# Patient Record
Sex: MALE | Race: WHITE | NOT HISPANIC OR LATINO | Employment: FULL TIME | URBAN - NONMETROPOLITAN AREA
[De-identification: names, ages, dates, MRNs, and addresses within clinical notes are randomized per-mention and may not be internally consistent; named-entity substitution may affect disease eponyms.]

---

## 2019-02-28 ENCOUNTER — OFFICE VISIT (OUTPATIENT)
Dept: CARDIOLOGY | Facility: CLINIC | Age: 24
End: 2019-02-28

## 2019-02-28 VITALS
OXYGEN SATURATION: 96 % | SYSTOLIC BLOOD PRESSURE: 125 MMHG | DIASTOLIC BLOOD PRESSURE: 80 MMHG | BODY MASS INDEX: 37.19 KG/M2 | WEIGHT: 315 LBS | HEART RATE: 110 BPM | HEIGHT: 77 IN

## 2019-02-28 DIAGNOSIS — R06.02 SOB (SHORTNESS OF BREATH): ICD-10-CM

## 2019-02-28 DIAGNOSIS — Z86.711 HISTORY OF PULMONARY EMBOLUS (PE): ICD-10-CM

## 2019-02-28 DIAGNOSIS — R00.2 PALPITATIONS: ICD-10-CM

## 2019-02-28 DIAGNOSIS — R01.1 MURMUR: ICD-10-CM

## 2019-02-28 DIAGNOSIS — R07.2 PRECORDIAL PAIN: Primary | ICD-10-CM

## 2019-02-28 PROCEDURE — 99204 OFFICE O/P NEW MOD 45 MIN: CPT | Performed by: PHYSICIAN ASSISTANT

## 2019-02-28 RX ORDER — LEVOTHYROXINE SODIUM 0.05 MG/1
50 TABLET ORAL DAILY
COMMUNITY

## 2019-02-28 RX ORDER — LISINOPRIL 5 MG/1
5 TABLET ORAL DAILY
COMMUNITY

## 2019-02-28 RX ORDER — KETOROLAC TROMETHAMINE 10 MG/1
10 TABLET, FILM COATED ORAL EVERY 6 HOURS PRN
COMMUNITY

## 2019-02-28 NOTE — PATIENT INSTRUCTIONS
What You Need to Know About Smokeless Tobacco Use  Tobacco use is one of the leading causes of cancer and other chronic health problems. Smokeless tobacco is tobacco that is put directly into the mouth instead of being smoked. It may also be called chewing tobacco or snuff. Smokeless tobacco is made from the leaves of tobacco plants and it comes in several forms:  · Loose, dry leaves, plugs, or twists.  · Moist pouches.  · Dissolving lozenges or strips.    Chewing, sucking, or holding the tobacco in your mouth causes your mouth to make more saliva. The saliva mixes with the tobacco to make “tobacco juice” that is swallowed or spit out.  How can smokeless tobacco affect me?  Using smokeless tobacco:  · Increases your risk of developing cancer. Smokeless tobacco contains at least 28 different types of cancer-causing chemicals (carcinogens).  · Increases your chances of developing other long-term health problems, including high blood pressure, heart disease, stroke, and dental problems.  · Can make you become addicted. Nicotine is one of the chemicals in tobacco. When you chew tobacco, you absorb nicotine from the tobacco juice. This can make you feel more alert than usual.  · Can cause problems with pregnancy. Pregnant women who use smokeless tobacco are more likely to miscarry or deliver a baby too early (premature delivery).  · Can affect the appearance and health of your mouth. Using smokeless tobacco may cause bad breath, yellow-brown teeth, mouth sores, cracking and bleeding lips, gum recession, and lesions on the soft tissues of your mouth (leukoplakia).    What are the benefits of not using smokeless tobacco?  The benefits of not using smokeless tobacco include:  · A healthy mind because:  ? You avoid addiction.  · A healthy body because:  ? You avoid dental problems.  ? You promote healthy pregnancy.  ? You avoid long-term health problems.  · A healthy wallet because:  ? You avoid costs of buying  tobacco.  ? You avoid health care costs in the future.  · A healthy family because:  ? You avoid accidental poisoning of children in your household.    What can happen if I continue to use smokeless tobacco?  If you continue to use smokeless tobacco, you will increase your risk for developing certain cancers. These include:  · Tongue.  · Lips, mouth, and gums.  · Throat (esophagus) and voice box (larynx).  · Stomach.  · Pancreas.  · Bladder.  · Colon.    Long-term use of smokeless tobacco can also lead to:  · High blood pressure, heart disease, and stroke.  · Gum disease, gum recession, and bone loss around the teeth.  · Tooth decay.    How do I quit using smokeless tobacco?  Quitting the use of smokeless tobacco can be hard, but it can be done. Follow these steps:  · Pick a date to quit. Set a date within the next two weeks. This gives you time to prepare.  · Write down the reasons why you are quitting. Keep this list in places where you will see it often, such as on your bathroom mirror or in your car or wallet.  · Identify the people, places, things, and activities that make you want to use tobacco (triggers) and avoid them.  · Get rid of any tobacco you have and remove any tobacco smells. To do this:  ? Throw away all containers of tobacco at home, at work, and in your car.  ? Throw away any other items that you use regularly when you chew tobacco.  ? Clean your car and make sure to remove all tobacco-related items.  ? Clean your home, including curtains and carpets.  · Tell your family, friends, and coworkers that you are quitting. This can make quitting easier.  · Ask your health care provider for help quitting smokeless tobacco. This may involve treatment. Find out what treatment options are covered by your health insurance.  · Keep track of how many days have passed since you quit. Remembering how long and hard you have worked to quit can help you avoid using tobacco again.    Where can I get support?  Ask  your health care provider if there is a local support group for quitting smokeless tobacco.  Where can I get more information?  You can learn more about the risks of using smokeless tobacco and the benefits of quitting from these sources:  · National Cancer [place]: www.cancer.gov  · American Cancer Society: www.cancer.org    When should I seek medical care?  Seek medical care if you have:  · White or other discolored patches in your mouth.  · Difficulty swallowing.  · A change in your voice.  · Unexplained weight loss.  · Stomach pain, nausea, or vomiting.    Summary  · Smokeless tobacco contains at least 28 different chemicals that are known to cause cancer (carcinogen).  · Nicotine is an addictive chemical in smokeless tobacco.  · When you quit using smokeless tobacco, you lower your risk of developing cancer.  This information is not intended to replace advice given to you by your health care provider. Make sure you discuss any questions you have with your health care provider.  Document Released: 05/21/2012 Document Revised: 08/12/2017 Document Reviewed: 07/29/2016  [employer] Interactive Patient Education © 2018 [employer]  Steps to Quit Smoking  Smoking tobacco can be bad for your health. It can also affect almost every organ in your body. Smoking puts you and people around you at risk for many serious long-lasting (chronic) diseases. Quitting smoking is hard, but it is one of the best things that you can do for your health. It is never too late to quit.  What are the benefits of quitting smoking?  When you quit smoking, you lower your risk for getting serious diseases and conditions. They can include:  · Lung cancer or lung disease.  · Heart disease.  · Stroke.  · Heart attack.  · Not being able to have children (infertility).  · Weak bones (osteoporosis) and broken bones (fractures).    If you have coughing, wheezing, and shortness of breath, those symptoms may get better when you quit. You may also get  sick less often. If you are pregnant, quitting smoking can help to lower your chances of having a baby of low birth weight.  What can I do to help me quit smoking?  Talk with your doctor about what can help you quit smoking. Some things you can do (strategies) include:  · Quitting smoking totally, instead of slowly cutting back how much you smoke over a period of time.  · Going to in-person counseling. You are more likely to quit if you go to many counseling sessions.  · Using resources and support systems, such as:  ? Online chats with a counselor.  ? Phone quitlines.  ? Printed self-help materials.  ? Support groups or group counseling.  ? Text messaging programs.  ? Mobile phone apps or applications.  · Taking medicines. Some of these medicines may have nicotine in them. If you are pregnant or breastfeeding, do not take any medicines to quit smoking unless your doctor says it is okay. Talk with your doctor about counseling or other things that can help you.    Talk with your doctor about using more than one strategy at the same time, such as taking medicines while you are also going to in-person counseling. This can help make quitting easier.  What things can I do to make it easier to quit?  Quitting smoking might feel very hard at first, but there is a lot that you can do to make it easier. Take these steps:  · Talk to your family and friends. Ask them to support and encourage you.  · Call phone quitlines, reach out to support groups, or work with a counselor.  · Ask people who smoke to not smoke around you.  · Avoid places that make you want (trigger) to smoke, such as:  ? Bars.  ? Parties.  ? Smoke-break areas at work.  · Spend time with people who do not smoke.  · Lower the stress in your life. Stress can make you want to smoke. Try these things to help your stress:  ? Getting regular exercise.  ? Deep-breathing exercises.  ? Yoga.  ? Meditating.  ? Doing a body scan. To do this, close your eyes, focus on one  "area of your body at a time from head to toe, and notice which parts of your body are tense. Try to relax the muscles in those areas.  · Download or buy apps on your mobile phone or tablet that can help you stick to your quit plan. There are many free apps, such as QuitGuide from the CDC (Centers for Disease Control and Prevention). You can find more support from smokefree.gov and other websites.    This information is not intended to replace advice given to you by your health care provider. Make sure you discuss any questions you have with your health care provider.  Document Released: 10/14/2010 Document Revised: 08/15/2017 Document Reviewed: 05/03/2016  [employer] Interactive Patient Education © 2018 [employer]  Fat and Cholesterol Restricted Diet  Getting too much fat and cholesterol in your diet may cause health problems. Following this diet helps keep your fat and cholesterol at normal levels. This can keep you from getting sick.  What types of fat should I choose?  · Choose monosaturated and polyunsaturated fats. These are found in foods such as olive oil, canola oil, flaxseeds, walnuts, almonds, and seeds.  · Eat more omega-3 fats. Good choices include salmon, mackerel, sardines, tuna, flaxseed oil, and ground flaxseeds.  · Limit saturated fats. These are in animal products such as meats, butter, and cream. They can also be in plant products such as palm oil, palm kernel oil, and coconut oil.  · Avoid foods with partially hydrogenated oils in them. These contain trans fats. Examples of foods that have trans fats are stick margarine, some tub margarines, cookies, crackers, and other baked goods.  What general guidelines do I need to follow?  · Check food labels. Look for the words \"trans fat\" and \"saturated fat.\"  · When preparing a meal:  ? Fill half of your plate with vegetables and green salads.  ? Fill one fourth of your plate with whole grains. Look for the word \"whole\" as the first word in the " ingredient list.  ? Fill one fourth of your plate with lean protein foods.  · Eat more foods that have fiber, like apples, carrots, beans, peas, and barley.  · Eat more home-cooked foods. Eat less at restaurants and buffets.  · Limit or avoid alcohol.  · Limit foods high in starch and sugar.  · Limit fried foods.  · Cook foods without frying them. Baking, boiling, grilling, and broiling are all great options.  · Lose weight if you are overweight. Losing even a small amount of weight can help your overall health. It can also help prevent diseases such as diabetes and heart disease.  What foods can I eat?  Grains  Whole grains, such as whole wheat or whole grain breads, crackers, cereals, and pasta. Unsweetened oatmeal, bulgur, barley, quinoa, or brown rice. Corn or whole wheat flour tortillas.  Vegetables  Fresh or frozen vegetables (raw, steamed, roasted, or grilled). Green salads.  Fruits  All fresh, canned (in natural juice), or frozen fruits.  Meat and Other Protein Products  Ground beef (85% or leaner), grass-fed beef, or beef trimmed of fat. Skinless chicken or turkey. Ground chicken or turkey. Pork trimmed of fat. All fish and seafood. Eggs. Dried beans, peas, or lentils. Unsalted nuts or seeds. Unsalted canned or dry beans.  Dairy  Low-fat dairy products, such as skim or 1% milk, 2% or reduced-fat cheeses, low-fat ricotta or cottage cheese, or plain low-fat yogurt.  Fats and Oils  Tub margarines without trans fats. Light or reduced-fat mayonnaise and salad dressings. Avocado. Olive, canola, sesame, or safflower oils. Natural peanut or almond butter (choose ones without added sugar and oil).  The items listed above may not be a complete list of recommended foods or beverages. Contact your dietitian for more options.  What foods are not recommended?  Grains  White bread. White pasta. White rice. Cornbread. Bagels, pastries, and croissants. Crackers that contain trans fat.  Vegetables  White potatoes. Corn.  Creamed or fried vegetables. Vegetables in a cheese sauce.  Fruits  Dried fruits. Canned fruit in light or heavy syrup. Fruit juice.  Meat and Other Protein Products  Fatty cuts of meat. Ribs, chicken wings, ellis, sausage, bologna, salami, chitterlings, fatback, hot dogs, bratwurst, and packaged luncheon meats. Liver and organ meats.  Dairy  Whole or 2% milk, cream, half-and-half, and cream cheese. Whole milk cheeses. Whole-fat or sweetened yogurt. Full-fat cheeses. Nondairy creamers and whipped toppings. Processed cheese, cheese spreads, or cheese curds.  Sweets and Desserts  Corn syrup, sugars, honey, and molasses. Candy. Jam and jelly. Syrup. Sweetened cereals. Cookies, pies, cakes, donuts, muffins, and ice cream.  Fats and Oils  Butter, stick margarine, lard, shortening, ghee, or ellis fat. Coconut, palm kernel, or palm oils.  Beverages  Alcohol. Sweetened drinks (such as sodas, lemonade, and fruit drinks or punches).  The items listed above may not be a complete list of foods and beverages to avoid. Contact your dietitian for more information.  This information is not intended to replace advice given to you by your health care provider. Make sure you discuss any questions you have with your health care provider.  Document Released: 06/18/2013 Document Revised: 08/24/2017 Document Reviewed: 03/19/2015  [employer] Interactive Patient Education © 2018 [employer]  BMI for Adults  Body mass index (BMI) is a number that is calculated from a person's weight and height. In most adults, the number is used to find how much of an adult's weight is made up of fat. BMI is not as accurate as a direct measure of body fat.  How is BMI calculated?  BMI is calculated by dividing weight in kilograms by height in meters squared. It can also be calculated by dividing weight in pounds by height in inches squared, then multiplying the resulting number by 703. Charts are available to help you find your BMI quickly and easily without  doing this calculation.  How is BMI interpreted?  Health care professionals use BMI charts to identify whether an adult is underweight, at a normal weight, or overweight based on the following guidelines:  · Underweight: BMI less than 18.5.  · Normal weight: BMI between 18.5 and 24.9.  · Overweight: BMI between 25 and 29.9.  · Obese: BMI of 30 and above.    BMI is usually interpreted the same for males and females.  Weight includes both fat and muscle, so someone with a muscular build, such as an athlete, may have a BMI that is higher than 24.9. In cases like these, BMI may not accurately depict body fat. To determine if excess body fat is the cause of a BMI of 25 or higher, further assessments may need to be done by a health care provider.  Why is BMI a useful tool?  BMI is used to identify a possible weight problem that may be related to a medical problem or may increase the risk for medical problems. BMI can also be used to promote changes to reach a healthy weight.  This information is not intended to replace advice given to you by your health care provider. Make sure you discuss any questions you have with your health care provider.  Document Released: 08/29/2005 Document Revised: 04/27/2017 Document Reviewed: 05/15/2015  [employer] Interactive Interactive Patient Education © 2018 [employer]

## 2019-02-28 NOTE — PROGRESS NOTES
Subjective   Agusto Silva is a 23 y.o. male     Chief Complaint   Patient presents with   • Establish Care   • Chest Pain       HPI  The patient presents in today for initial evaluation.  He is referred predominantly because of chest discomfort.  The patient's previous history includes DVT with PE, hypertension, and remote history of palpitations.  The patient denies any significant cardiovascular history otherwise.  The patient tells me that he has had roughly a 1 month history of chest discomfort.  He describes a precordial aching sensation.  This will last several minutes, typically averaging 15 minutes but most to 30 minutes, and then resolve spontaneously.  He has found nothing specifically which aggravates or alleviates his discomfort.  He describes this again mostly as a aching sensation but occasionally with a sharp stabbing sensation.  He will have a degree of palpitations with that at times, nothing really of significance.  The patient will have dyspnea at that time as well.  He denies PND orthopnea.  He reports adequate exercise capacity.  Blood pressures are fairly well controlled on current medical regimen.  Previous laboratory evaluation including CBC, CMP, and thyroid studies were unremarkable per patient report.  He has no further complaints otherwise and again has been referred to us for evaluation because of chest discomfort.      Current Outpatient Medications   Medication Sig Dispense Refill   • Canagliflozin (INVOKANA) 100 MG tablet Take 100 mg by mouth Daily.     • ketorolac (TORADOL) 10 MG tablet Take 10 mg by mouth Every 6 (Six) Hours As Needed for Moderate Pain .     • levothyroxine (SYNTHROID, LEVOTHROID) 50 MCG tablet Take 50 mcg by mouth Daily.     • lisinopril (PRINIVIL,ZESTRIL) 5 MG tablet Take 5 mg by mouth Daily.       No current facility-administered medications for this visit.        Lorabid [loracarbef]; Metformin; and Penicillins    Past Medical History:   Diagnosis Date   •  Diabetes mellitus (CMS/[facility])    • Hypertension    • Hypothyroid    • Pulmonary embolism (CMS/[facility]) 2014       Social History     Socioeconomic History   • Marital status:      Spouse name: Not on file   • Number of children: Not on file   • Years of education: Not on file   • Highest education level: Not on file   Social Needs   • Financial resource strain: Not on file   • Food insecurity - worry: Not on file   • Food insecurity - inability: Not on file   • Transportation needs - medical: Not on file   • Transportation needs - non-medical: Not on file   Occupational History   • Not on file   Tobacco Use   • Smoking status: Current Every Day Smoker     Packs/day: 1.00     Years: 5.00     Pack years: 5.00   • Smokeless tobacco: Current User     Types: Snuff   Substance and Sexual Activity   • Alcohol use: Yes     Alcohol/week: 3.6 - 7.2 oz     Types: 6 - 12 Cans of beer per week     Frequency: Monthly or less     Drinks per session: 10 or more   • Drug use: No   • Sexual activity: Defer   Other Topics Concern   • Not on file   Social History Narrative   • Not on file       Family History   Problem Relation Age of Onset   • Diabetes Mother    • Fibromyalgia Mother        Review of Systems   Constitutional: Positive for fatigue (Feels tired all the time ) and fever (Low grade fever for 2 weeks ).   HENT: Negative.  Negative for congestion, rhinorrhea and sore throat.    Eyes: Negative.  Negative for visual disturbance.   Respiratory: Positive for shortness of breath (SOA when he has chest pain ). Negative for chest tightness.    Cardiovascular: Positive for chest pain (Sharp pain in mid chest that radiates to right side for about 1 month.). Negative for palpitations and leg swelling.   Gastrointestinal: Negative.  Negative for abdominal pain, nausea and vomiting.   Endocrine: Positive for heat intolerance (Always hot ). Negative for cold intolerance.   Genitourinary: Positive for frequency (Urinary frequency since  "being diagnosed with diabetes). Negative for dysuria and urgency.   Musculoskeletal: Positive for back pain (Lower back pain ). Negative for arthralgias.   Skin: Negative for rash and wound.   Allergic/Immunologic: Negative.  Negative for environmental allergies and food allergies.   Neurological: Negative.  Negative for dizziness, weakness and light-headedness.   Hematological: Negative.  Does not bruise/bleed easily.   Psychiatric/Behavioral: Positive for agitation (Easily agitated ), confusion (Confusion ) and sleep disturbance (Has awaken a few times feeling like he is smothering in the last few weeks ). The patient is nervous/anxious (Stays anxious a lot of the time ).        Objective     Vitals:    02/28/19 1443   BP: 125/80   BP Location: Left arm   Patient Position: Sitting   Pulse: 110   SpO2: 96%   Weight: (!) 154 kg (340 lb)   Height: 195.6 cm (77\")        /80 (BP Location: Left arm, Patient Position: Sitting)   Pulse 110   Ht 195.6 cm (77\")   Wt (!) 154 kg (340 lb)   SpO2 96%   BMI 40.32 kg/m²      Lab Results (most recent)     None          Physical Exam   Constitutional: He is oriented to person, place, and time. He appears well-developed and well-nourished. No distress.   HENT:   Head: Normocephalic and atraumatic.   Eyes: Conjunctivae and EOM are normal. Pupils are equal, round, and reactive to light.   Neck: Normal range of motion. Neck supple. No JVD present. No tracheal deviation present.   Cardiovascular: Normal rate, regular rhythm and intact distal pulses.   Murmur heard.   Systolic (SM noted at left upper sternal border roughly at the second intercostal space.  Murmur is intensified with inspiration and basically resolves were silent with expiratory phase noted.) murmur is present with a grade of 1/6.  Pulmonary/Chest: Effort normal and breath sounds normal.   Abdominal: Soft. Bowel sounds are normal. He exhibits no distension and no mass. There is no tenderness. There is no " rebound and no guarding.   Musculoskeletal: Normal range of motion. He exhibits no edema, tenderness or deformity.   Neurological: He is alert and oriented to person, place, and time.   Skin: Skin is warm and dry. No rash noted. No erythema. No pallor.   Psychiatric: He has a normal mood and affect. His behavior is normal. Judgment and thought content normal.   Nursing note and vitals reviewed.      Procedure   Procedures         Assessment/Plan      Diagnosis Plan   1. Precordial pain  Adult Transthoracic Echo Complete W/ Cont if Necessary Per Protocol    Treadmill Stress Test   2. SOB (shortness of breath)  Adult Transthoracic Echo Complete W/ Cont if Necessary Per Protocol    Treadmill Stress Test   3. History of pulmonary embolus (PE)  Adult Transthoracic Echo Complete W/ Cont if Necessary Per Protocol    Treadmill Stress Test    Ambulatory Referral to Hematology   4. Murmur     5. Palpitations       Because of chest discomfort, I would like to schedule patient for regular treadmill stress test for ischemia assessment risk stratification.  He will need an echo because of symptoms and murmurs above.    We will see the patient back after stress and echo and recommend him further at that time.  He will call for any issues prior to follow-up.    I also feel that he needs repeat hematologic evaluation.  The patient was advised previously, by his description, that he had polycythemia and/or hypercoagulable disorder.  He had a history of DVT and PE.  He was advised that he was at risk for recurrent DVT/PE.  I would like to have him evaluated again to hematology to evaluate his overall status and risk for recurrent DVTs.  If felt significant, I feel that he would be a candidate to go back on anticoagulation.    We will see the patient back after all the above and recommend further at that time.  He will call for any issues prior to follow-up.             I advised Agusto of the risks of continuing to use tobacco, and I  provided him with tobacco cessation educational materials in the After Visit Summary.     During this visit, I spent >3 minutes counseling the patient regarding tobacco cessation.     Patient's Body mass index is 40.32 kg/m². BMI is above normal parameters. Recommendations include: educational material and referral to primary care.           Electronically signed by:

## 2019-09-17 ENCOUNTER — TELEPHONE (OUTPATIENT)
Dept: CARDIOLOGY | Facility: CLINIC | Age: 24
End: 2019-09-17